# Patient Record
Sex: MALE | Race: OTHER | NOT HISPANIC OR LATINO
[De-identification: names, ages, dates, MRNs, and addresses within clinical notes are randomized per-mention and may not be internally consistent; named-entity substitution may affect disease eponyms.]

---

## 2022-02-11 PROBLEM — Z00.00 ENCOUNTER FOR PREVENTIVE HEALTH EXAMINATION: Status: ACTIVE | Noted: 2022-02-11

## 2022-02-23 ENCOUNTER — APPOINTMENT (OUTPATIENT)
Dept: UROLOGY | Facility: CLINIC | Age: 82
End: 2022-02-23
Payer: MEDICARE

## 2022-02-23 VITALS
DIASTOLIC BLOOD PRESSURE: 69 MMHG | WEIGHT: 255 LBS | HEART RATE: 94 BPM | HEIGHT: 70 IN | TEMPERATURE: 97.07 F | BODY MASS INDEX: 36.51 KG/M2 | SYSTOLIC BLOOD PRESSURE: 116 MMHG

## 2022-02-23 DIAGNOSIS — N40.0 BENIGN PROSTATIC HYPERPLASIA WITHOUT LOWER URINARY TRACT SYMPMS: ICD-10-CM

## 2022-02-23 DIAGNOSIS — Z86.718 PERSONAL HISTORY OF OTHER VENOUS THROMBOSIS AND EMBOLISM: ICD-10-CM

## 2022-02-23 DIAGNOSIS — Z87.11 PERSONAL HISTORY OF PEPTIC ULCER DISEASE: ICD-10-CM

## 2022-02-23 DIAGNOSIS — Z78.9 OTHER SPECIFIED HEALTH STATUS: ICD-10-CM

## 2022-02-23 DIAGNOSIS — Z86.79 PERSONAL HISTORY OF OTHER DISEASES OF THE CIRCULATORY SYSTEM: ICD-10-CM

## 2022-02-23 PROCEDURE — 99204 OFFICE O/P NEW MOD 45 MIN: CPT

## 2022-02-23 RX ORDER — ERGOCALCIFEROL 1.25 MG/1
1.25 MG CAPSULE ORAL
Refills: 0 | Status: ACTIVE | COMMUNITY

## 2022-02-23 RX ORDER — MAGNESIUM AMINO ACID CHELATE 27 MG
500 (27 MG) TABLET ORAL
Refills: 0 | Status: ACTIVE | COMMUNITY

## 2022-02-23 RX ORDER — CARVEDILOL 3.12 MG/1
TABLET, FILM COATED ORAL
Refills: 0 | Status: ACTIVE | COMMUNITY

## 2022-02-23 RX ORDER — TAMSULOSIN HYDROCHLORIDE 0.4 MG/1
0.4 CAPSULE ORAL
Refills: 0 | Status: ACTIVE | COMMUNITY

## 2022-02-23 RX ORDER — FLUTICASONE PROPIONATE AND SALMETEROL 50; 100 UG/1; UG/1
100-50 POWDER RESPIRATORY (INHALATION)
Refills: 0 | Status: ACTIVE | COMMUNITY

## 2022-02-23 RX ORDER — METFORMIN HYDROCHLORIDE 500 MG/1
500 TABLET, COATED ORAL
Refills: 0 | Status: ACTIVE | COMMUNITY

## 2022-02-23 RX ORDER — CYANOCOBALAMIN (VITAMIN B-12) 500MCG/0.1
500 GEL (ML) NASAL
Refills: 0 | Status: ACTIVE | COMMUNITY

## 2022-02-23 RX ORDER — PANTOPRAZOLE SODIUM 40 MG/10ML
40 INJECTION, POWDER, FOR SOLUTION INTRAVENOUS
Refills: 0 | Status: ACTIVE | COMMUNITY

## 2022-02-23 RX ORDER — EPLERENONE 50 MG/1
TABLET, COATED ORAL
Refills: 0 | Status: ACTIVE | COMMUNITY

## 2022-02-24 ENCOUNTER — NON-APPOINTMENT (OUTPATIENT)
Age: 82
End: 2022-02-24

## 2022-02-24 LAB
ANION GAP SERPL CALC-SCNC: 16 MMOL/L
APPEARANCE: ABNORMAL
BACTERIA: NEGATIVE
BASOPHILS # BLD AUTO: 0.02 K/UL
BASOPHILS NFR BLD AUTO: 0.3 %
BILIRUBIN URINE: NEGATIVE
BLOOD URINE: ABNORMAL
BUN SERPL-MCNC: 21 MG/DL
CALCIUM SERPL-MCNC: 9.7 MG/DL
CHLORIDE SERPL-SCNC: 98 MMOL/L
CO2 SERPL-SCNC: 24 MMOL/L
COLOR: YELLOW
CREAT SERPL-MCNC: 1.39 MG/DL
EOSINOPHIL # BLD AUTO: 0.12 K/UL
EOSINOPHIL NFR BLD AUTO: 2.1 %
GLUCOSE QUALITATIVE U: NEGATIVE
GLUCOSE SERPL-MCNC: 141 MG/DL
HCT VFR BLD CALC: 48.7 %
HGB BLD-MCNC: 14.9 G/DL
HYALINE CASTS: 4 /LPF
IMM GRANULOCYTES NFR BLD AUTO: 0.3 %
KETONES URINE: NEGATIVE
LEUKOCYTE ESTERASE URINE: ABNORMAL
LYMPHOCYTES # BLD AUTO: 0.98 K/UL
LYMPHOCYTES NFR BLD AUTO: 17 %
MAN DIFF?: NORMAL
MCHC RBC-ENTMCNC: 28.2 PG
MCHC RBC-ENTMCNC: 30.6 GM/DL
MCV RBC AUTO: 92.2 FL
MICROSCOPIC-UA: NORMAL
MONOCYTES # BLD AUTO: 0.48 K/UL
MONOCYTES NFR BLD AUTO: 8.3 %
NEUTROPHILS # BLD AUTO: 4.15 K/UL
NEUTROPHILS NFR BLD AUTO: 72 %
NITRITE URINE: NEGATIVE
PH URINE: 6
PLATELET # BLD AUTO: 276 K/UL
POTASSIUM SERPL-SCNC: 4.8 MMOL/L
PROTEIN URINE: ABNORMAL
PSA SERPL-MCNC: 4.6 NG/ML
RBC # BLD: 5.28 M/UL
RBC # FLD: 15.1 %
RED BLOOD CELLS URINE: 31 /HPF
SODIUM SERPL-SCNC: 138 MMOL/L
SPECIFIC GRAVITY URINE: 1.02
SQUAMOUS EPITHELIAL CELLS: 1 /HPF
UROBILINOGEN URINE: NORMAL
WBC # FLD AUTO: 5.77 K/UL
WHITE BLOOD CELLS URINE: 164 /HPF

## 2022-02-25 ENCOUNTER — NON-APPOINTMENT (OUTPATIENT)
Age: 82
End: 2022-02-25

## 2022-02-28 ENCOUNTER — NON-APPOINTMENT (OUTPATIENT)
Age: 82
End: 2022-02-28

## 2022-02-28 LAB — BACTERIA UR CULT: ABNORMAL

## 2022-03-02 NOTE — PHYSICAL EXAM
[General Appearance - Well Developed] : well developed [General Appearance - Well Nourished] : well nourished [Normal Appearance] : normal appearance [Well Groomed] : well groomed [General Appearance - In No Acute Distress] : no acute distress [Respiration, Rhythm And Depth] : normal respiratory rhythm and effort [Exaggerated Use Of Accessory Muscles For Inspiration] : no accessory muscle use [Abdomen Soft] : soft [Abdomen Tenderness] : non-tender [Costovertebral Angle Tenderness] : no ~M costovertebral angle tenderness [Urinary Bladder Findings] : the bladder was normal on palpation [Testes Tenderness] : no tenderness of the testes [Testes Mass (___cm)] : there were no testicular masses [Prostate Tenderness] : the prostate was not tender [Normal Station and Gait] : the gait and station were normal for the patient's age [] : no rash [No Focal Deficits] : no focal deficits [Oriented To Time, Place, And Person] : oriented to person, place, and time [Affect] : the affect was normal [Mood] : the mood was normal [Not Anxious] : not anxious [No Palpable Adenopathy] : no palpable adenopathy [FreeTextEntry1] : confluent firmness of prostate gland

## 2022-03-02 NOTE — HISTORY OF PRESENT ILLNESS
[FreeTextEntry1] : Dear MARY Ha, \par \par Thank you so much for the referral to help care for your patient.\par \par Chief Complaint: PAE consult \par Date of first visit: 02/23/2022\par Accompanied by daughter\par \par MICHEAL ROJAS is a 81  year old [place] man with PMHx Afib s/p pacemaker on plavix, HTN, HLD, DVT LLE, DM who presents for PAE consult. He has had urinary symptoms for >12 years. Underwent a surgery (?TURP) 12 years ago and had no issues for 8 years. He went into urinary retention 1 month ago and is anxious to get Silva catheter out. Takes 0.4mg Flomax. He underwent UDS which showed minimal bladder function. He was given options of laser surgery vs PAE and he has high interest in PAE. He reports a hx of poor reaction to general anesthesia (difficulty waking up). He has no hx of bladder stones, kidney failure, recurrent UTI. Did experience hematuria after self catheter removal 1 month ago which has resolved.\par \par Hx of elevated PSA. Records indicate as high as 7.5 ng/ml in 2009, no recent records provided. He states he has had a prior prostate biopsy "years ago" which was negative. Denies family hx of  malignancies.\par \par PSA Hx\par 3.9 3/14/13\par 4.2 5/4/11\par 5.3 5/5/10\par 7.5 10/21/09\par \par 1/19/22 Renal US: mild bilateral cortical thinning, no hydro or stones\par 1/26/22 UDS: normal CMG, SER, EMG, UPP, compliance, capacity and sensation; hypotonicity of bladder\par \par The patient denies fevers, chills, nausea and or vomiting and no unexplained weight loss.\par \par All pertinent laboratory, films and physician notes were reviewed.  Questionnaire results were discussed with patient.\par \par I discussed with the patient and reviewed the risks and benefits and alternatives to prostate artery embolization. Time was spent with the patient discussing alternative therapies including TURP, urolift, medical therapy, laser, HoLEP, SPP and embolization. \par \par We discussed the early result and success of the procedure in general as well as my personal results. Specifically, reviewed risk related to nontarget embolization most commonly involving the bladder and/or rectum. We also spoke about the potential for post procedure obstruction required silva catheter drainage. We reviewed some technical aspects up of the procedure including embolic material utilizing the importance of cone beam CT prior to embolization. The patient wishes procedure scheduling a procedure.\par \par Specific risks of the embolization procedure which were discussed with the patient including infection, bleeding, dysuria, hematuria, hematospermia, non-target embolization which would result in damage to bladder wall leading to ulceration/ischemia, rectal wall injury, and injury to penis and ejaculatory mechanism. These risks are considered to be low. Pelvic pain and burning is not uncommon and considered to be mild and transient. The patient understands that a Silva catheter may be inserted during the procedure to help guide embolization and will be removed at the end of the procedure. A small percentage of patients will have some degree of urinary retention after embolization and will require a catheter to be left in. He understands this. We also discussed that long-term results of prostate embolization are unknown but response rates in reduction of his IPSS score are 80-85% based on current literature and state of the art techniques. \par \par It was explained to the patient that approximate 5% patient experienced some degree bruising following femoral or radial artery catheterization. The hematoma is benign and resolves spontaneously under typical circumstances. The specific benefits and risks of transradial (TR) access versus transfemoral (TF) access were discussed in detail with the patient. This includes decreased risk of bleeding, immediate ambulation and faster discharge time. Potential increased and extremely remote risk of cerebral emboli was discussed. The patient was given information packet with further details. The patient does indicate preference for transradial access during the procedure.\par

## 2022-03-02 NOTE — ASSESSMENT
[FreeTextEntry1] : 82 yo male with urinary retention requiring Leung catheter for the past month. Taking 0.4mg Flomax. Hx of elevated PSA and abnormal SYLVIE on exam 2/23/22\par \par 5'10"\par Left radial artery 3mm\par [place] B\par \par 1. MRI at [facility]- assess gland size and anatomy, abnormal SYLVIE\par 2. Book for PAE- TR- with a Leung for likely 2 weeks postop\par 3. He is aware of the risk of not voiding on his own post op (about 40% chance procedure will work given UDS findings). He is open to learning CIC postop if necessary.\par 4. Medical and cardiac clearance, preop labs, CXR, EKG\par 5. Can continue Plavix\par 6. Continue Flomax\par \par Follow up after MRI/before PAE\par \par \par Nurse Practitioner DARIUSZ Ashby assisted and functioned as a scribe for the above visit.  I have reviewed and agree with all materials present within the note.\par Thank you very much for allowing me to assist in the care of this patient. Should you have any additional questions or concerns please do not hesitate to contact me.\par \par \par Sincerely,\par \par \par Juan Power D.O.\par  of Urology and Radiology\par  of Urology at [facility]\par System Director for Prostate Cancer\par [address] [place] [address], 5th Floor [facility], [address]\par Phone: [phone]\par

## 2022-03-15 ENCOUNTER — NON-APPOINTMENT (OUTPATIENT)
Age: 82
End: 2022-03-15

## 2022-03-16 RX ORDER — CHROMIUM 200 MCG
TABLET ORAL
Refills: 0 | Status: ACTIVE | COMMUNITY

## 2022-03-16 RX ORDER — ASCORBIC ACID 500 MG
TABLET ORAL
Refills: 0 | Status: ACTIVE | COMMUNITY

## 2022-03-18 ENCOUNTER — NON-APPOINTMENT (OUTPATIENT)
Age: 82
End: 2022-03-18

## 2022-03-18 RX ORDER — SULFAMETHOXAZOLE AND TRIMETHOPRIM 800; 160 MG/1; MG/1
800-160 TABLET ORAL
Qty: 10 | Refills: 0 | Status: ACTIVE | COMMUNITY
Start: 2022-03-18 | End: 1900-01-01

## 2022-03-21 ENCOUNTER — APPOINTMENT (OUTPATIENT)
Dept: UROLOGY | Facility: CLINIC | Age: 82
End: 2022-03-21
Payer: MEDICARE

## 2022-03-21 VITALS — DIASTOLIC BLOOD PRESSURE: 65 MMHG | HEART RATE: 60 BPM | SYSTOLIC BLOOD PRESSURE: 103 MMHG | TEMPERATURE: 98.2 F

## 2022-03-21 PROCEDURE — 76857 US EXAM PELVIC LIMITED: CPT

## 2022-03-22 ENCOUNTER — RESULT REVIEW (OUTPATIENT)
Age: 82
End: 2022-03-22

## 2022-03-22 ENCOUNTER — APPOINTMENT (OUTPATIENT)
Dept: INTERVENTIONAL RADIOLOGY/VASCULAR | Facility: HOSPITAL | Age: 82
End: 2022-03-22

## 2022-03-22 ENCOUNTER — OUTPATIENT (OUTPATIENT)
Dept: OUTPATIENT SERVICES | Facility: HOSPITAL | Age: 82
LOS: 1 days | End: 2022-03-22
Payer: MEDICARE

## 2022-03-22 ENCOUNTER — APPOINTMENT (OUTPATIENT)
Dept: UROLOGY | Facility: HOSPITAL | Age: 82
End: 2022-03-22

## 2022-03-22 LAB — GLUCOSE BLDC GLUCOMTR-MCNC: 137 MG/DL — HIGH (ref 70–99)

## 2022-03-22 PROCEDURE — C1769: CPT

## 2022-03-22 PROCEDURE — 76937 US GUIDE VASCULAR ACCESS: CPT | Mod: 26

## 2022-03-22 PROCEDURE — 76937 US GUIDE VASCULAR ACCESS: CPT

## 2022-03-22 PROCEDURE — 36247 INS CATH ABD/L-EXT ART 3RD: CPT | Mod: 59

## 2022-03-22 PROCEDURE — 37243 VASC EMBOLIZE/OCCLUDE ORGAN: CPT

## 2022-03-22 PROCEDURE — C1894: CPT

## 2022-03-22 PROCEDURE — C1889: CPT

## 2022-03-22 PROCEDURE — C1887: CPT

## 2022-03-22 PROCEDURE — 82962 GLUCOSE BLOOD TEST: CPT

## 2022-03-23 ENCOUNTER — NON-APPOINTMENT (OUTPATIENT)
Age: 82
End: 2022-03-23

## 2022-04-04 DIAGNOSIS — N13.8 OTHER OBSTRUCTIVE AND REFLUX UROPATHY: ICD-10-CM

## 2022-04-04 DIAGNOSIS — N40.1 BENIGN PROSTATIC HYPERPLASIA WITH LOWER URINARY TRACT SYMPTOMS: ICD-10-CM

## 2022-04-04 DIAGNOSIS — R35.0 FREQUENCY OF MICTURITION: ICD-10-CM

## 2022-04-04 DIAGNOSIS — R39.16 STRAINING TO VOID: ICD-10-CM

## 2022-04-04 DIAGNOSIS — R35.1 NOCTURIA: ICD-10-CM

## 2022-04-04 DIAGNOSIS — R33.8 OTHER RETENTION OF URINE: ICD-10-CM

## 2022-04-04 DIAGNOSIS — R39.11 HESITANCY OF MICTURITION: ICD-10-CM

## 2022-04-04 DIAGNOSIS — R39.14 FEELING OF INCOMPLETE BLADDER EMPTYING: ICD-10-CM

## 2022-04-13 ENCOUNTER — APPOINTMENT (OUTPATIENT)
Dept: UROLOGY | Facility: CLINIC | Age: 82
End: 2022-04-13
Payer: MEDICARE

## 2022-04-13 PROCEDURE — 99212 OFFICE O/P EST SF 10 MIN: CPT

## 2022-04-13 NOTE — PHYSICAL EXAM
[Urinary Bladder Findings] : the bladder was normal on palpation [Testes Tenderness] : no tenderness of the testes [Testes Mass (___cm)] : there were no testicular masses [Prostate Tenderness] : the prostate was not tender [] : no respiratory distress [Respiration, Rhythm And Depth] : normal respiratory rhythm and effort [Exaggerated Use Of Accessory Muscles For Inspiration] : no accessory muscle use [No Focal Deficits] : no focal deficits [No Palpable Adenopathy] : no palpable adenopathy [General Appearance - Well Developed] : well developed [General Appearance - Well Nourished] : well nourished [Normal Appearance] : normal appearance [Well Groomed] : well groomed [General Appearance - In No Acute Distress] : no acute distress [Abdomen Soft] : soft [Abdomen Tenderness] : non-tender [Costovertebral Angle Tenderness] : no ~M costovertebral angle tenderness [Oriented To Time, Place, And Person] : oriented to person, place, and time [Affect] : the affect was normal [Mood] : the mood was normal [Not Anxious] : not anxious [Normal Station and Gait] : the gait and station were normal for the patient's age [FreeTextEntry1] : left radial pulse 2+ no hematoma

## 2022-04-13 NOTE — ASSESSMENT
[FreeTextEntry1] : 82 yo male with urinary retention requiring Leung catheter for the past month. Taking 0.4mg Flomax. Hx of elevated PSA and abnormal SYLVIE on exam 2/23/22.  PAE 3/22/22\par \par MRI was not completed due to: PM not compatible. TRUS in office 3/21/22 demonstrated 122cc no discrete masses or lesions\par \par 1. TOV today - passed. Taught CIC in case of emergency\par 2. He is aware of the risk of not voiding on his own post op (about 40% chance procedure will work given UDS findings). \par 3 Continue Flomax\par \par Follow up 2 months\par \par Thank you very much for allowing me to assist in the care of this patient. Should you have any additional questions or concerns please do not hesitate to contact me.\par \par \par Sincerely,\par \par \par Juan Power D.O.\par  of Urology and Radiology\par  of Urology at [facility]\par System Director for Prostate Cancer\par [address] [place], 5[address] Floor [facility]\par Phone: [phone]\par

## 2022-04-13 NOTE — HISTORY OF PRESENT ILLNESS
[FreeTextEntry1] : Dear MARY Ha, \par \par Thank you so much for the referral to help care for your patient.\par \par Chief Complaint: BPH\par Date of first visit: 02/23/2022\par Accompanied by daughter\par \par MICHEAL ROJAS is a 81  year old [place] man with PMHx Afib s/p pacemaker on plavix, HTN, HLD, DVT LLE, DM who presents s/p PAE 3/22/22. He has had urinary symptoms for >12 years. Underwent a surgery (?TURP) 12 years ago and had no issues for 8 years. He went into urinary retention March 2022 and is anxious to get Leung catheter out. \par \par Takes 0.4mg Flomax. He underwent UDS which showed minimal bladder function. \par \par He is s/p bilateral PAE on 3/22/22 and presents for TOV. Particles and glue on left side. Just glue on right side given close proximity to inferior vesicular artery\par 360cc in\par 440cc out\par PVR 39cc\par \par Hx of elevated PSA. Records indicate as high as 7.5 ng/ml in 2009, no recent records provided. He states he has had a prior prostate biopsy "years ago" which was negative. Denies family hx of  malignancies.\par \par PSA Hx\par 4.60 2/24/22\par 3.9 3/14/13\par 4.2 5/4/11\par 5.3 5/5/10\par 7.5 10/21/09\par \par 1/19/22 Renal US: mild bilateral cortical thinning, no hydro or stones\par 1/26/22 UDS: normal CMG, SER, EMG, UPP, compliance, capacity and sensation; hypotonicity of bladder\par \par The patient denies fevers, chills, nausea and or vomiting and no unexplained weight loss.\par \par All pertinent laboratory, films and physician notes were reviewed.  Questionnaire results were discussed with patient.

## 2022-04-26 ENCOUNTER — NON-APPOINTMENT (OUTPATIENT)
Age: 82
End: 2022-04-26

## 2022-04-26 DIAGNOSIS — R30.0 DYSURIA: ICD-10-CM

## 2022-04-29 DIAGNOSIS — N39.0 URINARY TRACT INFECTION, SITE NOT SPECIFIED: ICD-10-CM

## 2022-04-29 LAB
APPEARANCE: ABNORMAL
BACTERIA: ABNORMAL
BILIRUBIN URINE: NEGATIVE
BLOOD URINE: ABNORMAL
COLOR: ABNORMAL
GLUCOSE QUALITATIVE U: NEGATIVE
HYALINE CASTS: 0 /LPF
KETONES URINE: NEGATIVE
LEUKOCYTE ESTERASE URINE: ABNORMAL
MICROSCOPIC-UA: NORMAL
NITRITE URINE: NEGATIVE
PH URINE: 6
PROTEIN URINE: ABNORMAL
RED BLOOD CELLS URINE: 23 /HPF
SPECIFIC GRAVITY URINE: 1.02
SQUAMOUS EPITHELIAL CELLS: 1 /HPF
UROBILINOGEN URINE: NORMAL
WHITE BLOOD CELLS URINE: >720 /HPF

## 2022-04-29 RX ORDER — SULFAMETHOXAZOLE AND TRIMETHOPRIM 800; 160 MG/1; MG/1
800-160 TABLET ORAL TWICE DAILY
Qty: 14 | Refills: 0 | Status: ACTIVE | COMMUNITY
Start: 2022-04-29 | End: 1900-01-01

## 2022-05-02 ENCOUNTER — NON-APPOINTMENT (OUTPATIENT)
Age: 82
End: 2022-05-02

## 2022-05-02 LAB — BACTERIA UR CULT: ABNORMAL

## 2022-05-02 RX ORDER — AMOXICILLIN AND CLAVULANATE POTASSIUM 875; 125 MG/1; MG/1
875-125 TABLET, COATED ORAL
Qty: 14 | Refills: 0 | Status: ACTIVE | COMMUNITY
Start: 2022-05-02 | End: 1900-01-01

## 2022-05-03 ENCOUNTER — NON-APPOINTMENT (OUTPATIENT)
Age: 82
End: 2022-05-03

## 2022-05-13 ENCOUNTER — NON-APPOINTMENT (OUTPATIENT)
Age: 82
End: 2022-05-13

## 2022-05-19 ENCOUNTER — NON-APPOINTMENT (OUTPATIENT)
Age: 82
End: 2022-05-19

## 2022-05-26 ENCOUNTER — NON-APPOINTMENT (OUTPATIENT)
Age: 82
End: 2022-05-26

## 2022-05-31 ENCOUNTER — NON-APPOINTMENT (OUTPATIENT)
Age: 82
End: 2022-05-31

## 2022-05-31 LAB
APPEARANCE: CLEAR
BACTERIA UR CULT: NORMAL
BACTERIA: NEGATIVE
BILIRUBIN URINE: NEGATIVE
BLOOD URINE: NEGATIVE
COLOR: YELLOW
GLUCOSE QUALITATIVE U: NEGATIVE
HYALINE CASTS: 0 /LPF
KETONES URINE: NEGATIVE
LEUKOCYTE ESTERASE URINE: NEGATIVE
MICROSCOPIC-UA: NORMAL
NITRITE URINE: NEGATIVE
PH URINE: 6
PROTEIN URINE: NEGATIVE
RED BLOOD CELLS URINE: 2 /HPF
SPECIFIC GRAVITY URINE: 1.01
SQUAMOUS EPITHELIAL CELLS: 0 /HPF
UROBILINOGEN URINE: NORMAL
WHITE BLOOD CELLS URINE: 1 /HPF

## 2022-06-20 ENCOUNTER — APPOINTMENT (OUTPATIENT)
Dept: UROLOGY | Facility: CLINIC | Age: 82
End: 2022-06-20
Payer: MEDICARE

## 2022-06-20 VITALS — SYSTOLIC BLOOD PRESSURE: 125 MMHG | DIASTOLIC BLOOD PRESSURE: 71 MMHG | HEART RATE: 60 BPM | TEMPERATURE: 97.5 F

## 2022-06-20 PROCEDURE — 99024 POSTOP FOLLOW-UP VISIT: CPT

## 2022-06-21 ENCOUNTER — NON-APPOINTMENT (OUTPATIENT)
Age: 82
End: 2022-06-21

## 2022-06-21 LAB
APPEARANCE: CLEAR
BACTERIA: NEGATIVE
BILIRUBIN URINE: NEGATIVE
BLOOD URINE: NEGATIVE
COLOR: YELLOW
GLUCOSE QUALITATIVE U: NEGATIVE
HYALINE CASTS: 0 /LPF
KETONES URINE: NEGATIVE
LEUKOCYTE ESTERASE URINE: NEGATIVE
MICROSCOPIC-UA: NORMAL
NITRITE URINE: NEGATIVE
PH URINE: 6.5
PROTEIN URINE: NORMAL
RED BLOOD CELLS URINE: 4 /HPF
SPECIFIC GRAVITY URINE: 1.02
SQUAMOUS EPITHELIAL CELLS: 1 /HPF
UROBILINOGEN URINE: NORMAL
WHITE BLOOD CELLS URINE: 1 /HPF

## 2022-06-21 NOTE — ADDENDUM
[FreeTextEntry1] : I, Dr. Power, personally performed the evaluation and management (E/M) services for this established patient who presents today with (a) new problem(s)/exacerbation of (an) existing condition(s).  That E/M includes conducting the examination, assessing all new/exacerbated conditions, and establishing a new plan of care.  Today, my ACP, Karla Shipley NP, was here to observe my evaluation and management services for this new problem/exacerbated condition to be followed going forward\par

## 2022-06-21 NOTE — PHYSICAL EXAM
[General Appearance - Well Developed] : well developed [General Appearance - Well Nourished] : well nourished [Normal Appearance] : normal appearance [Well Groomed] : well groomed [General Appearance - In No Acute Distress] : no acute distress [Urinary Bladder Findings] : the bladder was normal on palpation [] : no respiratory distress [Respiration, Rhythm And Depth] : normal respiratory rhythm and effort [Exaggerated Use Of Accessory Muscles For Inspiration] : no accessory muscle use [Oriented To Time, Place, And Person] : oriented to person, place, and time [Affect] : the affect was normal [Mood] : the mood was normal [Normal Station and Gait] : the gait and station were normal for the patient's age [FreeTextEntry1] : ambulating with cane

## 2022-06-21 NOTE — ASSESSMENT
[FreeTextEntry1] : 82 yo male with BPH/hx of urinary retention requiring Leung catheter, s/p bilateral PAE 03/22/22. He reports improvement with LUTS- can now hold his urine for 8 hours, nocturia x 1-2, PVR= 229 cc today (down from 841 cc 01/18/2022). He is taking 0.4mg Flomax. \par \par Hx of elevated PSA and abnormal SYLVIE on exam 2/23/22. MRI was not completed due to: PM not compatible. TRUS in office 3/21/22 demonstrated 122cc no discrete masses or lesions\par \par 1. BPH w/ LUTS- he is experiencing improvement after PAE. Continue 0.4 mg Flomax daily d/t baseline of minimal bladder contraction. Prescription was refilled. \par 2. Post op follow up with Dr. Zambrano in 1-3 months\par 3. Follow up with us in 1 year (06/2023)\par \par Thank you very much for allowing me to assist in the care of this patient. Should you have any additional questions or concerns please do not hesitate to contact me.\par \par \par Sincerely,\par \par \par Juan Power D.O.\par  of Urology and Radiology\par  of Urology at [facility]\par System Director for Prostate Cancer\par [address] [place], 5[address] [facility], [address]\par Phone: [phone]\par

## 2022-06-22 LAB — BACTERIA UR CULT: NORMAL

## 2022-10-13 ENCOUNTER — APPOINTMENT (OUTPATIENT)
Dept: OTOLARYNGOLOGY | Facility: CLINIC | Age: 82
End: 2022-10-13

## 2022-10-13 VITALS — BODY MASS INDEX: 36.51 KG/M2 | HEIGHT: 70 IN | WEIGHT: 255 LBS

## 2022-10-13 DIAGNOSIS — H60.311 DIFFUSE OTITIS EXTERNA, RIGHT EAR: ICD-10-CM

## 2022-10-13 DIAGNOSIS — H61.20 IMPACTED CERUMEN, UNSPECIFIED EAR: ICD-10-CM

## 2022-10-13 PROCEDURE — 69210 REMOVE IMPACTED EAR WAX UNI: CPT

## 2022-10-13 PROCEDURE — 99203 OFFICE O/P NEW LOW 30 MIN: CPT | Mod: 25

## 2022-10-13 RX ORDER — PRAVASTATIN SODIUM 10 MG/1
10 TABLET ORAL
Refills: 0 | Status: DISCONTINUED | COMMUNITY
End: 2022-10-13

## 2022-10-13 RX ORDER — CLOPIDOGREL 75 MG/1
75 TABLET, FILM COATED ORAL
Refills: 0 | Status: DISCONTINUED | COMMUNITY
End: 2022-10-13

## 2022-10-13 RX ORDER — LIDOCAINE 5 G/100G
5 OINTMENT TOPICAL
Refills: 0 | Status: DISCONTINUED | COMMUNITY
End: 2022-10-13

## 2022-10-13 RX ORDER — TORSEMIDE 10 MG/1
10 TABLET ORAL
Refills: 0 | Status: DISCONTINUED | COMMUNITY
End: 2022-10-13

## 2022-10-13 RX ORDER — OFLOXACIN OTIC 3 MG/ML
0.3 SOLUTION AURICULAR (OTIC) TWICE DAILY
Qty: 1 | Refills: 2 | Status: ACTIVE | COMMUNITY
Start: 2022-10-13 | End: 1900-01-01

## 2022-10-13 RX ORDER — APIXABAN 5 MG/1
TABLET, FILM COATED ORAL
Refills: 0 | Status: ACTIVE | COMMUNITY

## 2022-10-13 NOTE — HISTORY OF PRESENT ILLNESS
[de-identified] : MICHEAL ROJAS has a history of right severe ear pain 3 weeks ago. Used OTC otic drops briefly without improvement. 10 days Later this has improved.  Now better. No change in hearing. No precipitating event. \par \par past medical history: Includes diabetes

## 2022-10-13 NOTE — CONSULT LETTER
[Please see my note below.] : Please see my note below. [FreeTextEntry2] : Dear VILMA ARGUETA  [FreeTextEntry1] : Thank you for allowing me to participate in the care of MICHEAL ROJAS .\par Please see the attached visit note.\par \par \par \par Chandler Diaz\par Otology\par Medical Director of [facility]\par Department of Otolaryngology\par [facility]

## 2022-10-13 NOTE — HISTORY OF PRESENT ILLNESS
[de-identified] : MICHEAL ROJAS has a history of right severe ear pain 3 weeks ago. Used OTC otic drops briefly without improvement. 10 days Later this has improved.  Now better. No change in hearing. No precipitating event. \par \par past medical history: Includes diabetes

## 2022-10-13 NOTE — CONSULT LETTER
[Please see my note below.] : Please see my note below. [FreeTextEntry2] : Dear VILMA ARGUETA  [FreeTextEntry1] : Thank you for allowing me to participate in the care of MICHEAL ROJAS .\par Please see the attached visit note.\par \par \par \par Chandler Diaz\par Otology\par Medical Director of [facility]\par Department of Otolaryngology\par [facility]

## 2022-10-13 NOTE — ASSESSMENT
[FreeTextEntry1] : History and findings are consistent with acute otitis externa in the right ear. There is no evidence of granulation but a history of diabetes. I have recommended the importance of vigilant care.\par Ear hygiene reviewed.\par \par floxin otic drops for one week. Followup in 2 weeks if symptoms persist

## 2022-10-13 NOTE — PHYSICAL EXAM
[FreeTextEntry1] : Microscopic ear exam with cerumen debridement:\par \par Right ear: Obstructing cerumen was debrided from the ear canal using suction, and curet.  Mild erythema of the ear canal without granulation.  The tympanic membrane was intact and noninflamed.\par \par Left ear: The ear canal was patent and nonobstructed.  The tympanic membrane was intact and noninflamed. [Midline] : trachea located in midline position [Normal] : no rashes

## 2023-07-17 ENCOUNTER — APPOINTMENT (OUTPATIENT)
Dept: UROLOGY | Facility: CLINIC | Age: 83
End: 2023-07-17
Payer: MEDICARE

## 2023-07-17 VITALS
DIASTOLIC BLOOD PRESSURE: 66 MMHG | OXYGEN SATURATION: 95 % | HEIGHT: 70 IN | SYSTOLIC BLOOD PRESSURE: 123 MMHG | BODY MASS INDEX: 35.79 KG/M2 | TEMPERATURE: 97.5 F | WEIGHT: 250 LBS | HEART RATE: 92 BPM

## 2023-07-17 DIAGNOSIS — N13.8 BENIGN PROSTATIC HYPERPLASIA WITH LOWER URINARY TRACT SYMPMS: ICD-10-CM

## 2023-07-17 DIAGNOSIS — N40.1 BENIGN PROSTATIC HYPERPLASIA WITH LOWER URINARY TRACT SYMPMS: ICD-10-CM

## 2023-07-17 PROCEDURE — 99213 OFFICE O/P EST LOW 20 MIN: CPT

## 2023-07-17 NOTE — HISTORY OF PRESENT ILLNESS
[FreeTextEntry1] : Dear MARY Ha, \par \par Thank you so much for the referral to help care for your patient.\par \par Chief Complaint: BPH\par Date of first visit: 02/23/2022\par \par \par MICHEAL ROJAS is a 82 year old [place] man with PMHx Afib s/p pacemaker on plavix, HTN, HLD, DVT LLE, DM who is s/p bilateral PAE on 3/22/22 (particles and glue on left side, just glue on right side given close proximity to inferior vesicular artery) (122 cc prostate). He has had urinary symptoms for >12 years. Underwent a surgery (?TURP) 12 years ago and had no issues for 8 years. He went into urinary retention March 2022 and was anxious to get the Leung catheter out. He underwent UDS which showed minimal bladder function. \par \par He is overall happy since the PAE 03/2022. He denies urgency and frequency. He can now hold his urine for 8 hours (before, q1-2 hours). He feels empty (PVR= 360 cc today, but PVR with Dr. Zambrano 01/18/2022= 841 cc). Nocturia x 0-1 (decreased from 5-6x). He denies dysuria and hematuria. He is still taking 0.4 mg Flomax daily. \par \par Hx of elevated PSA. Records indicate as high as 7.5 ng/ml in 2009, no recent records provided. He states he has had a prior prostate biopsy "years ago" which was negative. Denies family hx of  malignancies.\par \par PSA Hx\par 4.60 2/23/22\par 3.9 3/14/13\par 4.2 5/4/11\par 5.3 5/5/10\par 7.5 10/21/09\par \par 1/19/22 Renal US: mild bilateral cortical thinning, no hydro or stones\par 1/26/22 UDS: normal CMG, SER, EMG, UPP, compliance, capacity and sensation; hypotonicity of bladder\par \par 07/17/2023\par IPSS    5  QOL 1 - no CIC needed since procedure.\par GABRIELLE \par \par 06/20/2022\par IPSS  11  QOL 3\par GABRIELLE NSA\par PVR- 229 ml (decreased from 841 cc on 01/18/2022 with Dr. Zambrano)\par \par The patient denies fevers, chills, nausea and or vomiting and no unexplained weight loss.\par \par All pertinent laboratory, films and physician notes were reviewed.  Questionnaire results were discussed with patient.

## 2023-07-17 NOTE — ASSESSMENT
[FreeTextEntry1] : 83 yo male with BPH/hx of urinary retention requiring Leung catheter (CIC), s/p bilateral PAE 03/22/22. He reports improvement with LUTS- can now hold his urine for 8 hours, nocturia x 1-2, NVL=271 cc today (down from 841 cc 01/18/2022). He is taking 0.4mg Flomax and lasix for swelling.  He does have hypotonicity. \par \par Hx of elevated PSA and abnormal SYLVIE on exam 2/23/22. MRI was not completed due to: PM not compatible. TRUS in office 3/21/22 demonstrated 122cc no discrete masses or lesions\par \par 1. BPH w/ LUTS (improved) Continue 0.4 mg Flomax daily d/t baseline of minimal bladder contraction. Prescription was refilled. \par 2. Still Seeing Dr GAUTAM trend residuals - he will see in 1-2 months\par 3. if increasing residuals restart CIC \par 4. Patient reports normal renal function.\par \par Thank you very much for allowing me to assist in the care of this patient. Should you have any additional questions or concerns please do not hesitate to contact me.\par \par \par Sincerely,\par \par \par Juan Power D.O.\par  of Urology and Radiology\par  of Urology at [facility]\par System Director for Prostate Cancer\par [address] [place] [address], 5th Floor [facility], [address]\par Phone: [phone]\par

## 2023-07-18 PROBLEM — N40.1 BPH WITH OBSTRUCTION/LOWER URINARY TRACT SYMPTOMS: Status: ACTIVE | Noted: 2022-02-23

## 2023-09-05 RX ORDER — TAMSULOSIN HYDROCHLORIDE 0.4 MG/1
0.4 CAPSULE ORAL
Qty: 90 | Refills: 0 | Status: ACTIVE | COMMUNITY
Start: 2022-06-20 | End: 1900-01-01